# Patient Record
(demographics unavailable — no encounter records)

---

## 2024-12-02 NOTE — DATA REVIEWED
[FreeTextEntry1] : [place] Forms Score Parent: ADD /- (6) Hyperactivity - (6) ODD / - () Conduct Disorder 0/14 (3/14) Anxiety/depression- 2/- (3/7)  Performance AV.8  Teacher: Main  ADD - () Hyperactivity - () ODD/ Conduct Disorder 5/10 - (4/10) Anxiety/depression- 0- (3/7)  Performance Avg 4  Teacher:  ADD - (6) Hyperactivity - (6) ODD/ Conduct Disorder 0/10 - (4/10) Anxiety/depression- 0/- (3/7)  Performance Avg 4  
20

## 2024-12-02 NOTE — PLAN
[FreeTextEntry1] : [ ] Copy of FBA once completed  [ ]Continue current IEP [ ] Continue Metadate 20 mg daily with breakfast, side effects discussed as well as refill process [ ] Discussed use of Omega 3 fish oil [ ]Follow up 3 months

## 2024-12-02 NOTE — HISTORY OF PRESENT ILLNESS
[Home] : at home, [unfilled] , at the time of the visit. 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 Everyone is seeing a huge improvement from last year. The combination of medication, and therapies have been working well. Academically, he is doing great, some areas he is exceeding expectations. He is meeting his goals for IEP. He continues to have a lot of vocal stimming, making noises, or talking often. He is able to hold back behavior in school, once it wears off at home, he is letting it all out. He has worsened his picking of his skin. He is currently with a 3:1 aide, hoping next year, he will be independent. He is in afterschool club where he can play karate. His sleep has been great, goes to bed at 7:30pm, wakes up at 5:45 am. His appetite varies, but no concerns by his parents. He has a BIP in place no 504 at this time. There is a behaviorist that pushes in and OT 2x/wk for sensory craving and 2x/wk for psychologist.    Interval : The medication has given an improvement. All the teachers agree that his progress has improved in the past 6 months. He currently has a  that will be continued in 1st grade also. Parent would like to continue the same dosage. Next visit will be in person. Recently had IEP meeting. Services will remain the same including behavioral plan, psych services and OT.     Interval hx 24: The medication has helped in certain ways. There is still impulsivity and hyperactivity. Initially, first two weeks he needed adjustment with his sleep. His appetite was slightly off. His right eye was 50/20, MOC just found out he needed glasses. He is getting adjusted with the glasses, will be following up every 3 months. He now has an IEP in place for the ADHD. OT 3x/wk Behavioral intervention plan in place. He has a 1:1 TA , doing well that support in the classroom. MOC would like to trial medication increase.    Interval Hx: OT has been working with him and feels there is some sensory processing disorder. They started doing an FBA, observation and testing, they will go over the results in Feb for behavioral plan or possible TA. [place] forms reviewed at this time.   [place] Forms Score Parent: ADD /- (6/) Hyperactivity /- (6/) ODD / - (4/8) Conduct Disorder 0/14 (3/14) Anxiety/depression- 2/7- (3/7)  Performance AV.8  Teacher: Main  ADD - (6/) Hyperactivity - (6/) ODD/ Conduct Disorder 5/10 - (4/10) Anxiety/depression- 0/- (3/7)  Performance Avg 4  Teacher:  ADD - (6/) Hyperactivity - (6/) ODD/ Conduct Disorder 0/10 - (4/10) Anxiety/depression- 0/- (3/)  Performance Avg 4     Reviewed hx:  Academically, he is meeting or exceeding expectations. They believe that there is sensory issues going on, has OT services to help with impulsivity. Pediatrician had concern for behaviors since 3 years old. Some behaviors at school include invading personal space, inappropriate behavior,  screaming at the top of his lungs, and crawling under desks. He engages in "class clown behavior." He seems to aim for negative attention, talking back. He is constantly interrupting, talking over teachers, and when in a class that is not as structured, he does not do well. Behavior at school is becoming vey concerning. They will be doing an FBA , maybe 1:1 in specials and lunch setting if approved. When he talks to psychologist, he says he cannot control his behavior.  He can sustain attention with legos. Very impulsive during visit, insisted on getting blood pressure redone, would not stop bringing it up. Parent states he needs to know plans ahead of time and will constantly ask same questions.   Educational assessment:  Current Grade:   Current District: [place]  General ED/ Current Accommodations/ICT: General education OT2x/wk, school psychologist services given due to behavior issues.  Home assessment: MOC states it is difficult to travel due to negative attention. He can do Hw independently, very bright. Morning routine depends on the day, if there is incentive such as cartoon time, all things will get done to have it. He has difficulty with transitions. If he gets in trouble he can throw things, has a tantrum but things are escalating. Not much control with body. When eating a meal, mom gives him different options of meals to keep him engaged and seated, but fidgets often. He is an only child. He can sit through a whole movie. He has been talking back. Socially, he does well, has many friends but struggles with boundaries. He will walk up to anyone and start talking. No concern for anxiety, depression, ODD. Some concern with OCD, he wants the apple tv remote in the same spot. He likes his room just so. He has certain tendencies. Bedtime: 7-8 pm, wakes up at 5:45 am, he does not nap. He falls asleep right away. Denies staring, eye fluttering, twitching, seizure or seizure-like activity. No serious head injury, meningoencephalitis.

## 2024-12-02 NOTE — ASSESSMENT
[FreeTextEntry1] :  SHARON is a 6 year old male presenting for follow up evaluation of ADHD-Combined type  SHARON is in a general education  classroom setting. He receives counseling and OT services for sensory concerns. Sharon is having a difficult time with impulsive behavior and having control of behavior. Academically on or above grade level. Metadate 20 mg working well, parent would like to remain on current dose. Sharon now wears glasses, has a FBA in place as well as a TA and IEP for the ADHD.

## 2024-12-02 NOTE — PHYSICAL EXAM
[Well-appearing] : well-appearing [Normocephalic] : normocephalic [No dysmorphic facial features] : no dysmorphic facial features [Neck supple] : neck supple [Straight] : straight [No deformities] : no deformities [Alert] : alert [Well related, good eye contact] : well related, good eye contact [Conversant] : conversant [Normal speech and language] : normal speech and language [Follows instructions well] : follows instructions well [VFF] : VFF [Pupils reactive to light and accommodation] : pupils reactive to light and accommodation [Full extraocular movements] : full extraocular movements [Normal facial sensation to light touch] : normal facial sensation to light touch [No facial asymmetry or weakness] : no facial asymmetry or weakness [Gross hearing intact] : gross hearing intact [Equal palate elevation] : equal palate elevation [Good shoulder shrug] : good shoulder shrug [Normal tongue movement] : normal tongue movement [Midline tongue, no fasciculations] : midline tongue, no fasciculations [Normal axial and appendicular muscle tone] : normal axial and appendicular muscle tone [Gets up on table without difficulty] : gets up on table without difficulty [No pronator drift] : no pronator drift [Normal finger tapping and fine finger movements] : normal finger tapping and fine finger movements [No abnormal involuntary movements] : no abnormal involuntary movements [5/5 strength in proximal and distal muscles of arms and legs] : 5/5 strength in proximal and distal muscles of arms and legs [Walks and runs well] : walks and runs well [Able to do deep knee bend] : able to do deep knee bend [Able to walk on heels] : able to walk on heels [Able to walk on toes] : able to walk on toes [Knee jerks] : knee jerks [Localizes LT and temperature] : localizes LT and temperature [No dysmetria on FTNT] : no dysmetria on FTNT [Good walking balance] : good walking balance [Normal gait] : normal gait [Able to tandem well] : able to tandem well [Negative Romberg] : negative Romberg [de-identified] : Breathing even and unlabored

## 2024-12-02 NOTE — REASON FOR VISIT
[Follow-Up Evaluation] : a follow-up evaluation for [Mother] : mother [Family Member] : family member [FreeTextEntry2] : impulsivity, inattention

## 2024-12-02 NOTE — BIRTH HISTORY
[At Term] : at term [[place] [ Section] : by  section [None] : there were no delivery complications [Age Appropriate] : age appropriate developmental milestones met [de-identified] : breech position

## 2025-01-29 NOTE — HISTORY OF PRESENT ILLNESS
[de-identified] : Cough/ wheezing [FreeTextEntry6] : On day 3 or orapred Cough/ breathing improved Doing albuterol- last given this AM Doing budesonide BID No fever in last 24 hrs + runny nose/ congestion continues No V/D + R/E virus

## 2025-01-29 NOTE — DISCUSSION/SUMMARY
[FreeTextEntry1] :  7 yo M w/ R/E and noted RAD/ wheezing exacerbation.  Exam improved, no wheeze appreciated today, SPO2 improved, currently 97%, no distress noted.  Advised to complete steroid as prescribed. Space albuterol as tolerated. Continue budesonide BID.  Continue supportive care. RED FLAGS REVIEWED- discussed s/s of distress/ dehydration, discussed indications for going to ED for eval.  Parent expressed understanding and was able to verbalize back instructions/advice.  Parent to call/ return to office with patient for any concerns/ worsening symptoms.

## 2025-03-08 NOTE — DISCUSSION/SUMMARY
[Mother] : mother [Normal Growth] : growth [Normal Development] : development [None] : No known medical problems [No Elimination Concerns] : elimination [No Feeding Concerns] : feeding [No Skin Concerns] : skin [Normal Sleep Pattern] : sleep [ADHD] : attention deficit hyperactivity disorder [School] : school [Development and Mental Health] : development and mental health [Nutrition and Physical Activity] : nutrition and physical activity [Oral Health] : oral health [Safety] : safety [No Medication Changes] : No medication changes at this time [Patient] : patient [Full Activity without restrictions including Physical Education & Athletics] : Full Activity without restrictions including Physical Education & Athletics [de-identified] : Neuro/Ophtho as scheduled.  [FreeTextEntry1] :   6 y/o male currently well with normal BMI @20% with h/o ADHD f/b Neuro doing well on meds.  Continue balanced diet with all food groups. AAP 5210 reviewed - increase fruits/vegetables, NO sodas/juice- drink water only, <2 hr TV/screen time and at least 1 hour of exercise a day. Brush teeth twice a day with toothbrush. Recommend visit to dentist.  Help child to maintain consistent daily routines and sleep schedule.  School discussed.  Masking, social distancing and hand hygiene reviewed. Ensure home is safe. Teach child about personal safety. Water and sun safety discussed.  Use consistent, positive discipline.  Limit screen time to no more than 2 hours per day. Encourage physical activity. Vaccines UTD.  Return 1 year for routine well child check. Return sooner PRN Mom without questions at this time.

## 2025-03-08 NOTE — HISTORY OF PRESENT ILLNESS
[Mother] : mother [Eats healthy meals and snacks] : eats healthy meals and snacks [Eats meals with family] : eats meals with family [Toilet Trained] : toilet trained [Normal] : Normal [In own bed] : In own bed [Brushing teeth twice/d] : brushing teeth twice per day [Yes] : Patient goes to dentist yearly [Toothpaste] : Primary Fluoride Source: Toothpaste [Playtime (60 min/d)] : playtime 60 min a day [Participates in after-school activities] : participates in after-school activities [< 2 hrs of screen time per day] : less than 2 hrs of screen time per day [Appropiate parent-child-sibling interaction] : appropriate parent-child-sibling interaction [Has Friends] : has friends [Grade ___] : Grade [unfilled] [No] : No cigarette smoke exposure [Adequate social interactions] : adequate social interactions [Adequate performance] : adequate performance [Appropriately restrained in motor vehicle] : appropriately restrained in motor vehicle [Supervised outdoor play] : supervised outdoor play [Supervised around water] : supervised around water [Up to date] : Up to date [No difficulties with Homework] : no difficulties with homework [Parent discusses safety rules regarding adults] : parent does not discuss safety rules regarding adults [Exposure to electronic nicotine delivery system] : No exposure to electronic nicotine delivery system [de-identified] : None [FreeTextEntry7] : f/b NEURO for ADHD doing well on current medication. [FreeTextEntry9] : Has IEP meeting  [de-identified] : Ritalin/Metadate 20 mg working well, parent would like to remain on current dose. Homer now wears glasses, has a FBA in place as well as a TA and IEP for the ADHD.SBD - OT. ?increased OCD tendencies at home.

## 2025-03-08 NOTE — HISTORY OF PRESENT ILLNESS
[Mother] : mother [Eats healthy meals and snacks] : eats healthy meals and snacks [Eats meals with family] : eats meals with family [Toilet Trained] : toilet trained [Normal] : Normal [In own bed] : In own bed [Brushing teeth twice/d] : brushing teeth twice per day [Yes] : Patient goes to dentist yearly [Toothpaste] : Primary Fluoride Source: Toothpaste [Playtime (60 min/d)] : playtime 60 min a day [Participates in after-school activities] : participates in after-school activities [< 2 hrs of screen time per day] : less than 2 hrs of screen time per day [Appropiate parent-child-sibling interaction] : appropriate parent-child-sibling interaction [Has Friends] : has friends [Grade ___] : Grade [unfilled] [No] : No cigarette smoke exposure [Adequate social interactions] : adequate social interactions [Adequate performance] : adequate performance [Appropriately restrained in motor vehicle] : appropriately restrained in motor vehicle [Supervised outdoor play] : supervised outdoor play [Supervised around water] : supervised around water [Up to date] : Up to date [No difficulties with Homework] : no difficulties with homework [Parent discusses safety rules regarding adults] : parent does not discuss safety rules regarding adults [Exposure to electronic nicotine delivery system] : No exposure to electronic nicotine delivery system [de-identified] : None [FreeTextEntry7] : f/b NEURO for ADHD doing well on current medication. [FreeTextEntry9] : Has IEP meeting  [de-identified] : Ritalin/Metadate 20 mg working well, parent would like to remain on current dose. Homer now wears glasses, has a FBA in place as well as a TA and IEP for the ADHD.SBD - OT. ?increased OCD tendencies at home.

## 2025-03-08 NOTE — PHYSICAL EXAM
[Alert] : alert [No Acute Distress] : no acute distress [Cooperative] : cooperative [Normocephalic] : normocephalic [Conjunctivae with no discharge] : conjunctivae with no discharge [PERRL] : PERRL [EOMI Bilateral] : EOMI bilateral [Auricles Well Formed] : auricles well formed [Clear Tympanic membranes with present light reflex and bony landmarks] : clear tympanic membranes with present light reflex and bony landmarks [No Discharge] : no discharge [Nares Patent] : nares patent [Pink Nasal Mucosa] : pink nasal mucosa [Palate Intact] : palate intact [Nonerythematous Oropharynx] : nonerythematous oropharynx [Supple, full passive range of motion] : supple, full passive range of motion [No Palpable Masses] : no palpable masses [Symmetric Chest Rise] : symmetric chest rise [Clear to Auscultation Bilaterally] : clear to auscultation bilaterally [Regular Rate and Rhythm] : regular rate and rhythm [Normal S1, S2 present] : normal S1, S2 present [No Murmurs] : no murmurs [+2 Femoral Pulses] : +2 femoral pulses [Soft] : soft [NonTender] : non tender [Non Distended] : non distended [Normoactive Bowel Sounds] : normoactive bowel sounds [No Hepatomegaly] : no hepatomegaly [No Splenomegaly] : no splenomegaly [Testicles Descended Bilaterally] : testicles descended bilaterally [Patent] : patent [No fissures] : no fissures [No Abnormal Lymph Nodes Palpated] : no abnormal lymph nodes palpated [No Gait Asymmetry] : no gait asymmetry [No pain or deformities with palpation of bone, muscles, joints] : no pain or deformities with palpation of bone, muscles, joints [Normal Muscle Tone] : normal muscle tone [Straight] : straight [+2 Patella DTR] : +2 patella DTR [Cranial Nerves Grossly Intact] : cranial nerves grossly intact [No Rash or Lesions] : no rash or lesions [Tristin: _____] : Tristin [unfilled]

## 2025-03-08 NOTE — DISCUSSION/SUMMARY
[Mother] : mother [Normal Growth] : growth [Normal Development] : development [None] : No known medical problems [No Elimination Concerns] : elimination [No Feeding Concerns] : feeding [No Skin Concerns] : skin [Normal Sleep Pattern] : sleep [ADHD] : attention deficit hyperactivity disorder [School] : school [Development and Mental Health] : development and mental health [Nutrition and Physical Activity] : nutrition and physical activity [Oral Health] : oral health [Safety] : safety [No Medication Changes] : No medication changes at this time [Full Activity without restrictions including Physical Education & Athletics] : Full Activity without restrictions including Physical Education & Athletics [Patient] : patient [de-identified] : Neuro/Ophtho as scheduled.  [FreeTextEntry1] :   6 y/o male currently well with normal BMI @20% with h/o ADHD f/b Neuro doing well on meds.  Continue balanced diet with all food groups. AAP 5210 reviewed - increase fruits/vegetables, NO sodas/juice- drink water only, <2 hr TV/screen time and at least 1 hour of exercise a day. Brush teeth twice a day with toothbrush. Recommend visit to dentist.  Help child to maintain consistent daily routines and sleep schedule.  School discussed.  Masking, social distancing and hand hygiene reviewed. Ensure home is safe. Teach child about personal safety. Water and sun safety discussed.  Use consistent, positive discipline.  Limit screen time to no more than 2 hours per day. Encourage physical activity. Vaccines UTD.  Return 1 year for routine well child check. Return sooner PRN Mom without questions at this time.

## 2025-04-18 NOTE — PHYSICAL EXAM
[Well-appearing] : well-appearing [Normocephalic] : normocephalic [No dysmorphic facial features] : no dysmorphic facial features [Neck supple] : neck supple [Straight] : straight [No deformities] : no deformities [Alert] : alert [Well related, good eye contact] : well related, good eye contact [Conversant] : conversant [Normal speech and language] : normal speech and language [Follows instructions well] : follows instructions well [VFF] : VFF [Pupils reactive to light and accommodation] : pupils reactive to light and accommodation [Full extraocular movements] : full extraocular movements [Normal facial sensation to light touch] : normal facial sensation to light touch [No facial asymmetry or weakness] : no facial asymmetry or weakness [Gross hearing intact] : gross hearing intact [Equal palate elevation] : equal palate elevation [Good shoulder shrug] : good shoulder shrug [Normal tongue movement] : normal tongue movement [Midline tongue, no fasciculations] : midline tongue, no fasciculations [Normal axial and appendicular muscle tone] : normal axial and appendicular muscle tone [Gets up on table without difficulty] : gets up on table without difficulty [No pronator drift] : no pronator drift [Normal finger tapping and fine finger movements] : normal finger tapping and fine finger movements [No abnormal involuntary movements] : no abnormal involuntary movements [5/5 strength in proximal and distal muscles of arms and legs] : 5/5 strength in proximal and distal muscles of arms and legs [Walks and runs well] : walks and runs well [Able to do deep knee bend] : able to do deep knee bend [Able to walk on heels] : able to walk on heels [Able to walk on toes] : able to walk on toes [Knee jerks] : knee jerks [Localizes LT and temperature] : localizes LT and temperature [No dysmetria on FTNT] : no dysmetria on FTNT [Good walking balance] : good walking balance [Normal gait] : normal gait [Able to tandem well] : able to tandem well [Negative Romberg] : negative Romberg [de-identified] : Breathing even and unlabored

## 2025-04-18 NOTE — PHYSICAL EXAM
[Well-appearing] : well-appearing [Normocephalic] : normocephalic [No dysmorphic facial features] : no dysmorphic facial features [Neck supple] : neck supple [Straight] : straight [No deformities] : no deformities [Alert] : alert [Well related, good eye contact] : well related, good eye contact [Conversant] : conversant [Normal speech and language] : normal speech and language [Follows instructions well] : follows instructions well [VFF] : VFF [Pupils reactive to light and accommodation] : pupils reactive to light and accommodation [Full extraocular movements] : full extraocular movements [Normal facial sensation to light touch] : normal facial sensation to light touch [No facial asymmetry or weakness] : no facial asymmetry or weakness [Gross hearing intact] : gross hearing intact [Equal palate elevation] : equal palate elevation [Good shoulder shrug] : good shoulder shrug [Normal tongue movement] : normal tongue movement [Midline tongue, no fasciculations] : midline tongue, no fasciculations [Normal axial and appendicular muscle tone] : normal axial and appendicular muscle tone [Gets up on table without difficulty] : gets up on table without difficulty [No pronator drift] : no pronator drift [Normal finger tapping and fine finger movements] : normal finger tapping and fine finger movements [No abnormal involuntary movements] : no abnormal involuntary movements [5/5 strength in proximal and distal muscles of arms and legs] : 5/5 strength in proximal and distal muscles of arms and legs [Walks and runs well] : walks and runs well [Able to do deep knee bend] : able to do deep knee bend [Able to walk on heels] : able to walk on heels [Able to walk on toes] : able to walk on toes [Knee jerks] : knee jerks [Localizes LT and temperature] : localizes LT and temperature [No dysmetria on FTNT] : no dysmetria on FTNT [Good walking balance] : good walking balance [Normal gait] : normal gait [Able to tandem well] : able to tandem well [Negative Romberg] : negative Romberg [de-identified] : Breathing even and unlabored

## 2025-04-21 NOTE — REASON FOR VISIT
[Follow-Up Evaluation] : a follow-up evaluation for [Mother] : mother [Family Member] : family member [FreeTextEntry2] : impulsivity, inattention [Home] : at home, [unfilled] , at the time of the visit. [Medical Office: ([facility])___] : at the medical office located in  [This encounter was initiated by telehealth (audio with video) and converted to telephone (audio only)] : This encounter was initiated by telehealth (audio with video) and converted to telephone (audio only) [Technical] : patient unable to effectively utilize tele-video due to technical issues. [FreeTextEntry3] : Father  [Father] : father

## 2025-04-21 NOTE — ASSESSMENT
[FreeTextEntry1] :  SHARON is a 7 year old male presenting for follow up evaluation of ADHD-Combined type. Doing well academically as well as behaviorally on current dose of Metadate.  Increasing concerns for OCD symptoms as well as tic like behaviors since starting medication.

## 2025-04-21 NOTE — REASON FOR VISIT
[Follow-Up Evaluation] : a follow-up evaluation for [Mother] : mother [Family Member] : family member [FreeTextEntry2] : impulsivity, inattention [Home] : at home, [unfilled] , at the time of the visit. [Medical Office: ([facility])___] : at the medical office located in  [This encounter was initiated by telehealth (audio with video) and converted to telephone (audio only)] : This encounter was initiated by telehealth (audio with video) and converted to telephone (audio only) [Technical] : patient unable to effectively utilize tele-video due to technical issues. [FreeTextEntry3] : Father  [Father] : father

## 2025-04-21 NOTE — BIRTH HISTORY
[At Term] : at term [[place] [ Section] : by  section [None] : there were no delivery complications [Age Appropriate] : age appropriate developmental milestones met [de-identified] : breech position

## 2025-04-21 NOTE — DATA REVIEWED
[FreeTextEntry1] : [place] Forms Score Parent: ADD /- (6) Hyperactivity - (6) ODD / - () Conduct Disorder 0/14 (3/14) Anxiety/depression- 2/- (3/7)  Performance AV.8  Teacher: Main  ADD - () Hyperactivity - () ODD/ Conduct Disorder 5/10 - (4/10) Anxiety/depression- 0- (3/7)  Performance Avg 4  Teacher:  ADD - (6) Hyperactivity - (6) ODD/ Conduct Disorder 0/10 - (4/10) Anxiety/depression- 0/- (3/7)  Performance Avg 4

## 2025-04-21 NOTE — DATA REVIEWED
[FreeTextEntry1] : [place] Forms Score Parent: ADD /- (6) Hyperactivity - (6) ODD / - () Conduct Disorder 0/14 (3/14) Anxiety/depression- 2/- (3/7)  Performance AV.8  Teacher: Main  ADD - () Hyperactivity - () ODD/ Conduct Disorder 5/10 - (4/10) Anxiety/depression- 0- (3/7)  Performance Avg 4  Teacher:  ADD - (6) Hyperactivity - (6) ODD/ Conduct Disorder 0/10 - (4/10) Anxiety/depression- 0/- (3/7)  Performance Avg 4

## 2025-04-21 NOTE — CONSULT LETTER
[Dear  ___] : Dear  [unfilled], [Consult Letter:] : I had the pleasure of evaluating your patient, [unfilled]. [Consult Closing:] : Thank you very much for allowing me to participate in the care of this patient.  If you have any questions, please do not hesitate to contact me. [Sincerely,] : Sincerely, [FreeTextEntry3] : Jennifer Boudreaux CPNP Certified Pediatric Nurse Practitioner  Pediatric Neurology  [facility]

## 2025-04-21 NOTE — CONSULT LETTER
[Dear  ___] : Dear  [unfilled], [Consult Letter:] : I had the pleasure of evaluating your patient, [unfilled]. [Consult Closing:] : Thank you very much for allowing me to participate in the care of this patient.  If you have any questions, please do not hesitate to contact me. [Sincerely,] : Sincerely, [FreeTextEntry3] : Jennifer Boudreaux CPNP Certified Pediatric Nurse Practitioner  Pediatric Neurology  [facility]

## 2025-04-21 NOTE — HISTORY OF PRESENT ILLNESS
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  Sharon was last seen in 12/2024. He was started on Metadate 10mg in 1/2024 and last increased to 20mg in 5/2024. Father notes he is doing well on current dose.  He reports at recent IEP meeting there were no academic or behavior concerns from teachers.  He has made significant progress academically.  At home, parents note behavior is good- can had some afternoon rebound- but typically manageable.  Parents will medicate on weekends as without it he is still hyperactive and impulsive.    Parents have concerns for worsening in tics as well as OCD symptoms. Father notes he has a tendency to pick his cuticles, scabs and will excessively touch his eyes.  He will also line things up compulsively and will put items in certain places as well as hide them so he cant see them.  Teachers also see some of these symptoms in school.    He is eating well overall. Some concerns for difficulty initiating sleep.   Reviewed hx:  Academically, he is meeting or exceeding expectations. They believe that there is sensory issues going on, has OT services to help with impulsivity. Pediatrician had concern for behaviors since 3 years old. Some behaviors at school include invading personal space, inappropriate behavior,  screaming at the top of his lungs, and crawling under desks. He engages in "class clown behavior." He seems to aim for negative attention, talking back. He is constantly interrupting, talking over teachers, and when in a class that is not as structured, he does not do well. Behavior at school is becoming vey concerning. They will be doing an FBA , maybe 1:1 in specials and lunch setting if approved. When he talks to psychologist, he says he cannot control his behavior.  He can sustain attention with legos. Very impulsive during visit, insisted on getting blood pressure redone, would not stop bringing it up. Parent states he needs to know plans ahead of time and will constantly ask same questions.   Educational assessment:  Current Grade:   Current District: [facility] ED/ Current Accommodations/ICT: General education OT2x/wk, school psychologist services given due to behavior issues.  Home assessment: [facility] states it is difficult to travel due to negative attention. He can do Hw independently, very bright. Morning routine depends on the day, if there is incentive such as cartoon time, all things will get done to have it. He has difficulty with transitions. If he gets in trouble he can throw things, has a tantrum but things are escalating. Not much control with body. When eating a meal, mom gives him different options of meals to keep him engaged and seated, but fidgets often. He is an only child. He can sit through a whole movie. He has been talking back. Socially, he does well, has many friends but struggles with boundaries. He will walk up to anyone and start talking. No concern for anxiety, depression, ODD. Some concern with OCD, he wants the apple tv remote in the same spot. He likes his room just so. He has certain tendencies. Bedtime: 7-8 pm, wakes up at 5:45 am, he does not nap. He falls asleep right away. Denies staring, eye fluttering, twitching, seizure or seizure-like activity. No serious head injury, meningoencephalitis.

## 2025-04-21 NOTE — HISTORY OF PRESENT ILLNESS
[FreeTextEntry1] : SHARON is a 7-year-old male here for follow up evaluation of ADHD- Combined type   Current Grade: 1st grade  Current District: [facility]/ Current Accommodations/ICT: General education OT2x/wk, school psychologist services given due to behavior issues.  Sharon was last seen in 12/2024. He was started on Metadate 10mg in 1/2024 and last increased to 20mg in 5/2024. Father notes he is doing well on current dose.  He reports at recent IEP meeting there were no academic or behavior concerns from teachers.  He has made significant progress academically.  At home, parents note behavior is good- can had some afternoon rebound- but typically manageable.  Parents will medicate on weekends as without it he is still hyperactive and impulsive.    Parents have concerns for worsening in tics as well as OCD symptoms. Father notes he has a tendency to pick his cuticles, scabs and will excessively touch his eyes.  He will also line things up compulsively and will put items in certain places as well as hide them so he cant see them.  Teachers also see some of these symptoms in school.    He is eating well overall. Some concerns for difficulty initiating sleep.   Reviewed hx:  Academically, he is meeting or exceeding expectations. They believe that there is sensory issues going on, has OT services to help with impulsivity. Pediatrician had concern for behaviors since 3 years old. Some behaviors at school include invading personal space, inappropriate behavior,  screaming at the top of his lungs, and crawling under desks. He engages in "class clown behavior." He seems to aim for negative attention, talking back. He is constantly interrupting, talking over teachers, and when in a class that is not as structured, he does not do well. Behavior at school is becoming vey concerning. They will be doing an FBA , maybe 1:1 in specials and lunch setting if approved. When he talks to psychologist, he says he cannot control his behavior.  He can sustain attention with legos. Very impulsive during visit, insisted on getting blood pressure redone, would not stop bringing it up. Parent states he needs to know plans ahead of time and will constantly ask same questions.   Educational assessment:  Current Grade:   Current District: [facility]/ Current Accommodations/ICT: General education OT2x/wk, school psychologist services given due to behavior issues.  [place] [facility] [place] due to negative attention. He can do Hw independently, very bright. Morning routine depends on the day, if there is incentive such as cartoon time, all things will get done to have it. He has difficulty with transitions. If he gets in trouble he can throw things, has a tantrum but things are escalating. Not much control with body. When eating a meal, mom gives him different options of meals to keep him engaged and seated, but fidgets often. He is an only child. He can sit through a whole movie. He has been talking back. Socially, he does well, has many friends but struggles with boundaries. He will walk up to anyone and start talking. No concern for anxiety, depression, ODD. Some concern with OCD, he wants the apple tv remote in the same spot. He likes his room just so. He has certain tendencies. Bedtime: 7-8 pm, wakes up at 5:45 am, he does not nap. He falls asleep right away. Denies staring, eye fluttering, twitching, seizure or seizure-like activity. No serious head injury, meningoencephalitis.

## 2025-04-21 NOTE — BIRTH HISTORY
[At Term] : at term [[place] [ Section] : by  section [None] : there were no delivery complications [Age Appropriate] : age appropriate developmental milestones met [de-identified] : breech position

## 2025-04-21 NOTE — PLAN
[FreeTextEntry1] : - Discussed with Father trial of alternative stimulant to see if improvement in OCD symptoms vs trial of non-stimulant to improve tics - Father would like to trial non-stimulant as doing well otherwise on Metadate.   - Start Guanfacine 0.5mg QHS- may increase to 1mg if no improvement.  Side effects discussed - If no improvement would trial alternative stimulant.  - Follow up TEB 2 months- call sooner for concerns